# Patient Record
Sex: FEMALE | Race: WHITE | NOT HISPANIC OR LATINO | URBAN - METROPOLITAN AREA
[De-identification: names, ages, dates, MRNs, and addresses within clinical notes are randomized per-mention and may not be internally consistent; named-entity substitution may affect disease eponyms.]

---

## 2022-12-14 ENCOUNTER — ANESTHESIA EVENT (OUTPATIENT)
Dept: PERIOP | Facility: HOSPITAL | Age: 62
End: 2022-12-14

## 2022-12-15 ENCOUNTER — HOSPITAL ENCOUNTER (OUTPATIENT)
Dept: RADIOLOGY | Facility: HOSPITAL | Age: 62
Setting detail: OUTPATIENT SURGERY
Discharge: HOME | End: 2022-12-15

## 2022-12-15 ENCOUNTER — APPOINTMENT (OUTPATIENT)
Dept: RADIOLOGY | Facility: HOSPITAL | Age: 62
End: 2022-12-15

## 2022-12-15 ENCOUNTER — HOSPITAL ENCOUNTER (OUTPATIENT)
Facility: HOSPITAL | Age: 62
Setting detail: OUTPATIENT SURGERY
Discharge: HOME | End: 2022-12-15
Attending: PODIATRIST | Admitting: PODIATRIST

## 2022-12-15 ENCOUNTER — ANESTHESIA (OUTPATIENT)
Dept: PERIOP | Facility: HOSPITAL | Age: 62
End: 2022-12-15

## 2022-12-15 VITALS
RESPIRATION RATE: 16 BRPM | OXYGEN SATURATION: 99 % | WEIGHT: 175.49 LBS | TEMPERATURE: 98 F | HEIGHT: 64 IN | DIASTOLIC BLOOD PRESSURE: 75 MMHG | HEART RATE: 63 BPM | SYSTOLIC BLOOD PRESSURE: 136 MMHG | BODY MASS INDEX: 29.96 KG/M2

## 2022-12-15 DIAGNOSIS — M20.41 OTHER HAMMER TOE(S) (ACQUIRED), RIGHT FOOT: ICD-10-CM

## 2022-12-15 DIAGNOSIS — G89.18 ACUTE POST-OPERATIVE PAIN: Primary | ICD-10-CM

## 2022-12-15 PROBLEM — I10 HTN (HYPERTENSION): Status: ACTIVE | Noted: 2022-12-15

## 2022-12-15 PROBLEM — I50.9 CHF (CONGESTIVE HEART FAILURE) (HCC): Status: ACTIVE | Noted: 2022-12-15

## 2022-12-15 RX ORDER — ATORVASTATIN CALCIUM 40 MG/1
40 TABLET, FILM COATED ORAL DAILY
COMMUNITY
Start: 2022-08-24 | End: 2023-08-24

## 2022-12-15 RX ORDER — ONDANSETRON 2 MG/ML
INJECTION INTRAMUSCULAR; INTRAVENOUS AS NEEDED
Status: DISCONTINUED | OUTPATIENT
Start: 2022-12-15 | End: 2022-12-15

## 2022-12-15 RX ORDER — FENTANYL CITRATE 50 UG/ML
INJECTION, SOLUTION INTRAMUSCULAR; INTRAVENOUS AS NEEDED
Status: DISCONTINUED | OUTPATIENT
Start: 2022-12-15 | End: 2022-12-15

## 2022-12-15 RX ORDER — MEPERIDINE HYDROCHLORIDE 25 MG/ML
12.5 INJECTION INTRAMUSCULAR; INTRAVENOUS; SUBCUTANEOUS ONCE AS NEEDED
Status: DISCONTINUED | OUTPATIENT
Start: 2022-12-15 | End: 2022-12-15 | Stop reason: HOSPADM

## 2022-12-15 RX ORDER — DEXAMETHASONE SODIUM PHOSPHATE 10 MG/ML
INJECTION, SOLUTION INTRAMUSCULAR; INTRAVENOUS AS NEEDED
Status: DISCONTINUED | OUTPATIENT
Start: 2022-12-15 | End: 2022-12-15

## 2022-12-15 RX ORDER — MELATONIN
5000 DAILY
COMMUNITY

## 2022-12-15 RX ORDER — FAMOTIDINE 40 MG/1
40 TABLET, FILM COATED ORAL DAILY
COMMUNITY
Start: 2022-10-24

## 2022-12-15 RX ORDER — ONDANSETRON 2 MG/ML
4 INJECTION INTRAMUSCULAR; INTRAVENOUS EVERY 6 HOURS PRN
Status: DISCONTINUED | OUTPATIENT
Start: 2022-12-15 | End: 2022-12-15 | Stop reason: HOSPADM

## 2022-12-15 RX ORDER — OXYCODONE HYDROCHLORIDE 5 MG/1
5 TABLET ORAL EVERY 4 HOURS PRN
Status: DISCONTINUED | OUTPATIENT
Start: 2022-12-15 | End: 2022-12-15 | Stop reason: HOSPADM

## 2022-12-15 RX ORDER — FUROSEMIDE 40 MG/1
40 TABLET ORAL 2 TIMES DAILY
COMMUNITY

## 2022-12-15 RX ORDER — OXYCODONE HYDROCHLORIDE AND ACETAMINOPHEN 5; 325 MG/1; MG/1
1 TABLET ORAL EVERY 6 HOURS PRN
Qty: 20 TABLET | Refills: 0 | Status: SHIPPED | OUTPATIENT
Start: 2022-12-15 | End: 2022-12-25

## 2022-12-15 RX ORDER — PROPOFOL 10 MG/ML
INJECTION, EMULSION INTRAVENOUS AS NEEDED
Status: DISCONTINUED | OUTPATIENT
Start: 2022-12-15 | End: 2022-12-15

## 2022-12-15 RX ORDER — FENTANYL CITRATE/PF 50 MCG/ML
50 SYRINGE (ML) INJECTION
Status: DISCONTINUED | OUTPATIENT
Start: 2022-12-15 | End: 2022-12-15 | Stop reason: HOSPADM

## 2022-12-15 RX ORDER — ENALAPRIL MALEATE 20 MG/1
20 TABLET ORAL DAILY
COMMUNITY

## 2022-12-15 RX ORDER — ACETAMINOPHEN 325 MG/1
650 TABLET ORAL EVERY 4 HOURS PRN
Status: DISCONTINUED | OUTPATIENT
Start: 2022-12-15 | End: 2022-12-15 | Stop reason: HOSPADM

## 2022-12-15 RX ORDER — MIDAZOLAM HYDROCHLORIDE 2 MG/2ML
INJECTION, SOLUTION INTRAMUSCULAR; INTRAVENOUS AS NEEDED
Status: DISCONTINUED | OUTPATIENT
Start: 2022-12-15 | End: 2022-12-15

## 2022-12-15 RX ORDER — PROPOFOL 10 MG/ML
INJECTION, EMULSION INTRAVENOUS CONTINUOUS PRN
Status: DISCONTINUED | OUTPATIENT
Start: 2022-12-15 | End: 2022-12-15

## 2022-12-15 RX ORDER — ONDANSETRON 2 MG/ML
4 INJECTION INTRAMUSCULAR; INTRAVENOUS ONCE AS NEEDED
Status: DISCONTINUED | OUTPATIENT
Start: 2022-12-15 | End: 2022-12-15 | Stop reason: HOSPADM

## 2022-12-15 RX ORDER — ACETAMINOPHEN,DIPHENHYDRAMINE HCL 500; 25 MG/1; MG/1
1 TABLET, FILM COATED ORAL AS NEEDED
COMMUNITY

## 2022-12-15 RX ORDER — SODIUM CHLORIDE 9 MG/ML
125 INJECTION, SOLUTION INTRAVENOUS CONTINUOUS
Status: DISCONTINUED | OUTPATIENT
Start: 2022-12-15 | End: 2022-12-15 | Stop reason: HOSPADM

## 2022-12-15 RX ORDER — CEFAZOLIN SODIUM 2 G/50ML
2000 SOLUTION INTRAVENOUS
Status: DISCONTINUED | OUTPATIENT
Start: 2022-12-15 | End: 2022-12-15 | Stop reason: HOSPADM

## 2022-12-15 RX ORDER — HYDROMORPHONE HCL/PF 1 MG/ML
0.5 SYRINGE (ML) INJECTION
Status: DISCONTINUED | OUTPATIENT
Start: 2022-12-15 | End: 2022-12-15 | Stop reason: HOSPADM

## 2022-12-15 RX ORDER — PROMETHAZINE HYDROCHLORIDE 25 MG/ML
6.25 INJECTION, SOLUTION INTRAMUSCULAR; INTRAVENOUS ONCE AS NEEDED
Status: DISCONTINUED | OUTPATIENT
Start: 2022-12-15 | End: 2022-12-15 | Stop reason: HOSPADM

## 2022-12-15 RX ORDER — DIPHENOXYLATE HYDROCHLORIDE AND ATROPINE SULFATE 2.5; .025 MG/1; MG/1
1 TABLET ORAL DAILY
COMMUNITY

## 2022-12-15 RX ADMIN — CEFAZOLIN SODIUM 2000 MG: 2 SOLUTION INTRAVENOUS at 10:00

## 2022-12-15 RX ADMIN — DEXAMETHASONE SODIUM PHOSPHATE 8 MG: 10 INJECTION INTRAMUSCULAR; INTRAVENOUS at 10:10

## 2022-12-15 RX ADMIN — ONDANSETRON 4 MG: 2 INJECTION INTRAMUSCULAR; INTRAVENOUS at 10:10

## 2022-12-15 RX ADMIN — PROPOFOL 30 MG: 10 INJECTION, EMULSION INTRAVENOUS at 10:13

## 2022-12-15 RX ADMIN — PROPOFOL 80 MCG/KG/MIN: 10 INJECTION, EMULSION INTRAVENOUS at 10:11

## 2022-12-15 RX ADMIN — PROPOFOL 30 MG: 10 INJECTION, EMULSION INTRAVENOUS at 10:20

## 2022-12-15 RX ADMIN — MIDAZOLAM 2 MG: 1 INJECTION INTRAMUSCULAR; INTRAVENOUS at 10:05

## 2022-12-15 RX ADMIN — FENTANYL CITRATE 100 MCG: 50 INJECTION INTRAMUSCULAR; INTRAVENOUS at 10:09

## 2022-12-15 RX ADMIN — SODIUM CHLORIDE 125 ML/HR: 0.9 INJECTION, SOLUTION INTRAVENOUS at 08:57

## 2022-12-15 NOTE — ANESTHESIA PREPROCEDURE EVALUATION
Procedure:  5th HAMMERTOE w/ DEROTATIONAL REPAIR (Right: Foot)    Relevant Problems   CARDIO   (+) CHF (congestive heart failure) (HCC)   (+) HTN (hypertension)        Physical Exam    Airway    Mallampati score: II  TM Distance: >3 FB  Neck ROM: full     Dental   upper dentures,     Cardiovascular  Rhythm: regular, Rate: normal, Cardiovascular exam normal    Pulmonary  Pulmonary exam normal Breath sounds clear to auscultation,     Other Findings        Anesthesia Plan  ASA Score- 3     Anesthesia Type- IV sedation with anesthesia with ASA Monitors  Additional Monitors:   Airway Plan:     Comment: 2D Echo 2017    Normal left ventricular systolic function     Normal right ventricular size and function     No significant valve abnormalities       ? grade II diastolic dysfunction     Visually Estimated LV Ejection Fraction is:55%      Plan Factors-Exercise tolerance (METS): >4 METS  Chart reviewed  Patient is a current smoker  Patient not instructed to abstain from smoking on day of procedure  Patient smoked on day of surgery  Induction- intravenous  Postoperative Plan- Plan for postoperative opioid use  Informed Consent- Anesthetic plan and risks discussed with patient

## 2022-12-15 NOTE — ANESTHESIA POSTPROCEDURE EVALUATION
Post-Op Assessment Note    CV Status:  Stable    Pain management: adequate     Mental Status:  Alert and awake   Hydration Status:  Euvolemic   PONV Controlled:  Controlled   Airway Patency:  Patent      Post Op Vitals Reviewed: Yes      Staff: Anesthesiologist, CRNA         No notable events documented      BP      Temp      Pulse     Resp      SpO2      /75   Pulse 63   Temp 98 °F (36 7 °C) (Temporal)   Resp 16   Ht 5' 4" (1 626 m)   Wt 79 6 kg (175 lb 7 8 oz)   SpO2 99%   BMI 30 12 kg/m²

## 2022-12-15 NOTE — DISCHARGE INSTR - AVS FIRST PAGE
Dr Harvey Babinski    1  Take your prescribed medication as directed  2  Upon arrival at home, lie down and elevate your surgical foot on 2 pillows  3  Remain quiet, off your feet as much as possible, for the first 24-48 hours  This is when your feet first swell and may become painful  After 48 hours you may begin limited walking following these restrictions:   Weightbear as tolerated to surgical foot with surgical shoe  4  Drink large quantities of water and citrus fruit juice  Consume no alcohol  Continue a well-balanced diet  5  Report any unusual discomfort or fever to this office  6  A limited amount of discomfort and swelling is to be expected  In some cases the skin may take on a bruised appearance  The surgical solution that was applied to your foot prior to the operation is dark in color and the operation site may appear to be oozing when it actually is not  7  A slight amount of blood is to be expected, and is no cause for alarm  Do not remove the dressings  If there is active bleeding and if the bleeding persists, add additional gauze to the bandage, apply direct pressure, elevate your feet and call this office  8  Do not get the dressings wet  As regular bathing may be inconvenient, sponge baths are recommended  9  When anesthesia wears off and if any discomfort should be present, apply an ice pack directly over the operated area for 15 minute intervals for several hours or until the pain leaves  (USE IN EXCESS OF 15 MINUTES COULD CAUSE FROSTBITE)  Do not use hot water bags or electric pads  A convenient icepack can be made by placing ice cubes in a plastic bag and covering this with a towel  10  If necessary, take a mild laxative before retiring  11  Wear your special open shoes anytime you put weight on your foot, even if it is just to walk to the bathroom and back  It will probably be 2 or 3 weeks before you will be permitted to try regular shoes    12  Having performed the operation, we are interested in a prompt recovery  Please cooperate by following the above instructions  13  Please call to confirm your post-op appointment or call with any other questions

## 2022-12-15 NOTE — DISCHARGE SUMMARY
Discharge Summary Outpatient Procedure Podiatry -   Kostas Montelongo 57 y o  female MRN: [id number]  Unit/Bed#: OR POOL Encounter: [id number]    Admission Date: 12/15/2022     Admitting Diagnosis: Other hammer toe(s) (acquired), right foot [M20 41]    Discharge Diagnosis: same    Procedures Performed: 5th HAMMERTOE w/ DEROTATIONAL REPAIR: 00813 (CPT®)    Complications: none    Condition at Discharge: stable    Discharge instructions/Information to patient and family:   See after visit summary for information provided to patient and family  Provisions for Follow-Up Care/Important appointments:  See after visit summary for information related to follow-up care and any pertinent home health orders  Discharge Medications:  See after visit summary for reconciled discharge medications provided to patient and family

## 2022-12-15 NOTE — OP NOTE
OPERATIVE REPORT - Podiatry  PATIENT NAME: Tate Mojica    :  1960  MRN: [id number]  Pt Location: [facility] OR ROOM 07    SURGERY DATE: 12/15/2022    Surgeon(s) and Role: * Lester Sinha DPM - Primary     * Kervin Hunt DPM - Assisting    Pre-op Diagnosis:  Other hammer toe(s) (acquired), right foot [M20 41]    Post-Op Diagnosis Codes:     * Other hammer toe(s) (acquired), right foot [M20 41]    Procedure(s) (LRB):  5th HAMMERTOE w/ DEROTATIONAL REPAIR (Right)    Specimen(s):  * No specimens in log *    Estimated Blood Loss:   Minimal    Drains:  * No LDAs found *    Anesthesia Type:   IV Sedation with Anesthesia with 10 ml of 1% Lidocaine and 0 5% Bupivacaine in a 1:1 mixture    Hemostasis:  -18 inch pneumatic ankle tourniquet inflated to 250 mmHg for 21 minutes  -Atraumatic technique    Materials:  * No implants in log *  3-0 Vicryl  4-0 nylon    Operative Findings:  Consistent with diagnosis    Complications:   None    Procedure and Technique:     Under mild sedation, the patient was brought into the operating room and remained on the stretcher in the supine position  IV sedation was achieved by anesthesia team and a universal timeout was performed where all parties are in agreement of correct patient, correct procedure and correct site  A pneumatic tourniquet was then placed over the patient's right lower extremity with ample padding  A digital block was performed consisting of 10 ml of 1% Lidocaine and 0 5% Bupivacaine in a 1:1 mixture  The foot was then prepped and draped in the usual aseptic manner  An esmarch bandage was used to exsangunate the foot and the pneumatic tourniquet was then inflated to 250mmHg  Attention was directed to the 5th toe with noted varus postioning  An elliptical skin incision was drawn from distal medial to proximal lateral over the toe  Incision was made through skin using a 15 blade and the skin was sharply dissected out from the subcuticular layer    The PIPJ was identified and medial and lateral collateral ligaments were freed from the surrounding joint  The extensor digitorum longus tendon was reflected proximally utilizing a surgical blade  Sagittal saw was then utilized to make a through and through osteotomy of the proximal phalanx head  The distal aspect of the proximal phalanx was removed from the foot and passed off the field  The wound was then flushed with copious amounts of normal sterile saline  The underlying extensor digitorum longus tendon was reapproximated utilizing 3-0 Vicryl  Skin was reapproximated using 4-0 nylon in simple interrupted fashion with noted excellent derotation of the toe into a more rectus position  The foot was then cleansed and dried the incision site was dressed with Xeroform, 4 x 4 gauze, Storm, and a 4 inch Ace bandage  The tourniquet was deflated at approximately 21 min and normal hyperemic response was noted to all digits  The patient tolerated the procedure and anesthesia well without immediate complications and transferred to PACU with vital signs stable  Dr Vinicius Ramírez was present during the entire procedure and participated in all carrion aspects  SIGNATURELeonor Ant, DPMARGOT  DATE: December 15, 2022  TIME: 10:59 AM      Portions of the record may have been created with voice recognition software  Occasional wrong word or "sound a like" substitutions may have occurred due to the inherent limitations of voice recognition software  Read the chart carefully and recognize, using context, where substitutions have occurred

## 2023-05-12 ENCOUNTER — HOSPITAL ENCOUNTER (OUTPATIENT)
Dept: RADIOLOGY | Facility: HOSPITAL | Age: 63
Discharge: HOME | End: 2023-05-12

## 2023-05-12 DIAGNOSIS — M25.552 LEFT HIP PAIN: ICD-10-CM

## 2023-05-12 DIAGNOSIS — M25.562 LEFT KNEE PAIN, UNSPECIFIED CHRONICITY: ICD-10-CM

## (undated) DEVICE — OCCLUSIVE GAUZE STRIP,3% BISMUTH TRIBROMOPHENATE IN PETROLATUM BLEND: Brand: XEROFORM

## (undated) DEVICE — PLUMEPEN PRO 10FT

## (undated) DEVICE — 10FR FRAZIER SUCTION HANDLE: Brand: CARDINAL HEALTH

## (undated) DEVICE — SCD SEQUENTIAL COMPRESSION COMFORT SLEEVE MEDIUM KNEE LENGTH: Brand: KENDALL SCD

## (undated) DEVICE — GLOVE INDICATOR PI UNDERGLOVE SZ 8 BLUE

## (undated) DEVICE — BETHLEHEM UNIVERSAL  MIONR EXT: Brand: CARDINAL HEALTH

## (undated) DEVICE — NEEDLE 25G X 1 1/2

## (undated) DEVICE — NEEDLE 18 G X 1 1/2

## (undated) DEVICE — ACE WRAP 4 IN UNSTERILE

## (undated) DEVICE — SUT VICRYL 4-0 PS-2 27 IN J426H

## (undated) DEVICE — 30.0 MM X 5.8 MM X 0.60 MM SAGITTAL BLADE

## (undated) DEVICE — INTENDED FOR TISSUE SEPARATION, AND OTHER PROCEDURES THAT REQUIRE A SHARP SURGICAL BLADE TO PUNCTURE OR CUT.: Brand: BARD-PARKER ® CARBON RIB-BACK BLADES

## (undated) DEVICE — GLOVE SRG BIOGEL 7.5

## (undated) DEVICE — COBAN 4 IN STERILE

## (undated) DEVICE — STOCKINETTE REGULAR

## (undated) DEVICE — SUT ETHILON 4-0 PS-2 18 IN 1667H

## (undated) DEVICE — CHLORAPREP HI-LITE 26ML ORANGE

## (undated) DEVICE — CAST PADDING 4 IN SYNTHETIC NON-STRL

## (undated) DEVICE — SYRINGE 10ML LL

## (undated) DEVICE — 2000CC GUARDIAN II: Brand: GUARDIAN

## (undated) DEVICE — STRETCH BANDAGE: Brand: CURITY

## (undated) DEVICE — SUT VICRYL 3-0 SH 27 IN J416H